# Patient Record
Sex: MALE | Race: WHITE | NOT HISPANIC OR LATINO | Employment: FULL TIME | URBAN - METROPOLITAN AREA
[De-identification: names, ages, dates, MRNs, and addresses within clinical notes are randomized per-mention and may not be internally consistent; named-entity substitution may affect disease eponyms.]

---

## 2022-08-17 ENCOUNTER — TRANSFERRED RECORDS (OUTPATIENT)
Dept: HEALTH INFORMATION MANAGEMENT | Facility: CLINIC | Age: 32
End: 2022-08-17

## 2023-01-11 ENCOUNTER — TRANSFERRED RECORDS (OUTPATIENT)
Dept: HEALTH INFORMATION MANAGEMENT | Facility: CLINIC | Age: 33
End: 2023-01-11

## 2023-01-11 LAB — EJECTION FRACTION: NORMAL %

## 2023-01-12 ENCOUNTER — TRANSFERRED RECORDS (OUTPATIENT)
Dept: HEALTH INFORMATION MANAGEMENT | Facility: CLINIC | Age: 33
End: 2023-01-12

## 2024-02-29 ENCOUNTER — OFFICE VISIT (OUTPATIENT)
Dept: CARDIOLOGY | Facility: CLINIC | Age: 34
End: 2024-02-29
Payer: COMMERCIAL

## 2024-02-29 VITALS
WEIGHT: 157.4 LBS | HEART RATE: 64 BPM | DIASTOLIC BLOOD PRESSURE: 88 MMHG | SYSTOLIC BLOOD PRESSURE: 110 MMHG | RESPIRATION RATE: 20 BRPM

## 2024-02-29 DIAGNOSIS — E78.5 DYSLIPIDEMIA, GOAL LDL BELOW 70: ICD-10-CM

## 2024-02-29 DIAGNOSIS — J45.20 MILD INTERMITTENT ASTHMA, UNSPECIFIED WHETHER COMPLICATED: ICD-10-CM

## 2024-02-29 DIAGNOSIS — I25.10 CORONARY ARTERY DISEASE INVOLVING NATIVE CORONARY ARTERY OF NATIVE HEART WITHOUT ANGINA PECTORIS: Primary | ICD-10-CM

## 2024-02-29 DIAGNOSIS — I10 BENIGN ESSENTIAL HYPERTENSION: ICD-10-CM

## 2024-02-29 LAB
ALBUMIN SERPL BCG-MCNC: 4.5 G/DL (ref 3.5–5.2)
ALP SERPL-CCNC: 92 U/L (ref 40–150)
ALT SERPL W P-5'-P-CCNC: 43 U/L (ref 0–70)
ANION GAP SERPL CALCULATED.3IONS-SCNC: 13 MMOL/L (ref 7–15)
AST SERPL W P-5'-P-CCNC: 31 U/L (ref 0–45)
BILIRUB SERPL-MCNC: 0.5 MG/DL
BUN SERPL-MCNC: 14.5 MG/DL (ref 6–20)
CALCIUM SERPL-MCNC: 9.3 MG/DL (ref 8.6–10)
CHLORIDE SERPL-SCNC: 104 MMOL/L (ref 98–107)
CREAT SERPL-MCNC: 0.88 MG/DL (ref 0.67–1.17)
DEPRECATED HCO3 PLAS-SCNC: 26 MMOL/L (ref 22–29)
EGFRCR SERPLBLD CKD-EPI 2021: >90 ML/MIN/1.73M2
ERYTHROCYTE [DISTWIDTH] IN BLOOD BY AUTOMATED COUNT: 11.6 % (ref 10–15)
GLUCOSE SERPL-MCNC: 61 MG/DL (ref 70–99)
HCT VFR BLD AUTO: 46 % (ref 40–53)
HGB BLD-MCNC: 16.2 G/DL (ref 13.3–17.7)
MCH RBC QN AUTO: 30.9 PG (ref 26.5–33)
MCHC RBC AUTO-ENTMCNC: 35.2 G/DL (ref 31.5–36.5)
MCV RBC AUTO: 88 FL (ref 78–100)
PLATELET # BLD AUTO: 266 10E3/UL (ref 150–450)
POTASSIUM SERPL-SCNC: 3.7 MMOL/L (ref 3.4–5.3)
PROT SERPL-MCNC: 7.5 G/DL (ref 6.4–8.3)
RBC # BLD AUTO: 5.24 10E6/UL (ref 4.4–5.9)
SODIUM SERPL-SCNC: 143 MMOL/L (ref 135–145)
WBC # BLD AUTO: 5.6 10E3/UL (ref 4–11)

## 2024-02-29 PROCEDURE — 80053 COMPREHEN METABOLIC PANEL: CPT | Performed by: INTERNAL MEDICINE

## 2024-02-29 PROCEDURE — 99204 OFFICE O/P NEW MOD 45 MIN: CPT | Performed by: INTERNAL MEDICINE

## 2024-02-29 PROCEDURE — 85027 COMPLETE CBC AUTOMATED: CPT | Performed by: INTERNAL MEDICINE

## 2024-02-29 PROCEDURE — 36415 COLL VENOUS BLD VENIPUNCTURE: CPT | Performed by: INTERNAL MEDICINE

## 2024-02-29 PROCEDURE — 80061 LIPID PANEL: CPT | Performed by: INTERNAL MEDICINE

## 2024-02-29 RX ORDER — ASPIRIN 81 MG/1
81 TABLET, CHEWABLE ORAL DAILY
COMMUNITY

## 2024-02-29 RX ORDER — QUETIAPINE FUMARATE 50 MG/1
50 TABLET, FILM COATED ORAL AT BEDTIME
COMMUNITY

## 2024-02-29 RX ORDER — LOSARTAN POTASSIUM 25 MG/1
25 TABLET ORAL DAILY
COMMUNITY
End: 2024-03-05

## 2024-02-29 RX ORDER — METOPROLOL SUCCINATE 100 MG/1
100 TABLET, EXTENDED RELEASE ORAL DAILY
COMMUNITY

## 2024-02-29 RX ORDER — ATORVASTATIN CALCIUM 40 MG/1
40 TABLET, FILM COATED ORAL DAILY
COMMUNITY
End: 2024-07-02

## 2024-02-29 RX ORDER — DEXTROAMPHETAMINE SACCHARATE, AMPHETAMINE ASPARTATE MONOHYDRATE, DEXTROAMPHETAMINE SULFATE AND AMPHETAMINE SULFATE 5; 5; 5; 5 MG/1; MG/1; MG/1; MG/1
20 CAPSULE, EXTENDED RELEASE ORAL EVERY MORNING
COMMUNITY

## 2024-02-29 RX ORDER — TRAZODONE HYDROCHLORIDE 50 MG/1
50 TABLET, FILM COATED ORAL AT BEDTIME
COMMUNITY

## 2024-02-29 RX ORDER — VENLAFAXINE HYDROCHLORIDE 75 MG/1
75 TABLET, EXTENDED RELEASE ORAL DAILY
COMMUNITY

## 2024-02-29 NOTE — PROGRESS NOTES
Click to link to [facility] HEART CARE NOTE    Thank you,  , for asking me to see Robb Jarvis in consultation at [facility] to establish cardiology care.      Assessment/Plan:   Coronary artery disease status post MI and stent placement in January 2022 in [place]: We will get his medical records including coronary angiogram with PCI procedure report from his previous cardiologist in [place].  He has no chest pain or shortness of breath.  He does exercise regularly, no symptoms.  Continue lifestyle modification.  Continue aspirin, atorvastatin, metoprolol ER.  Echocardiogram in January 2023 was reviewed, mentioned below.  Routine laboratory tests including CBC, CMP and lipid profile today.  Benign essential hypertension: His blood pressure is well-controlled.  Continue metoprolol  mg daily, losartan 25 mg daily.  Dyslipidemia: Continue atorvastatin 40 mg at bedtime.  Lipid profile and liver function as mentioned above.  Asthma: Stable.    Thank you for the opportunity to be involved in the care of Robb Jarvis. If you have any questions, please feel free to contact me.  I will see the patient again in 6 months and as needed    Much or all of the text in this note was generated through the use of Dragon Dictate voice-to-text software. Errors in spelling or words which seem out of context are unintentional.   Sound alike errors, in particular, may have escaped editing.       History of Present Illness:   It is my pleasure to see Robb Jarvis at the [facility] for establishing cardiology care. Robb Jarvis is a 33 year old male with a medical history of coronary artery disease status post MI and stent placement in January 2022 in [place], benign essential hypertension, dyslipidemia, mild exertional related asthma.    He states that he had no chest pain, shortness of breath.  He has been doing exercise, 6 miles in  some day with no chest pain or shortness of breath.  When he had food poisoning, he had some chest pain.  Otherwise, he did not have chest pain with exercise.  He denies shortness of breath on exertion.  He had no palpitations, dizziness, orthopnea, PND or leg edema.  His blood pressure and heart rate are controlled.    His medical records are reviewed.  Previous cardiologist note and coronary angiogram report are not available at this time.  We will get it.  He had echocardiogram in January 2023 when he had food poisoning.  Echo was reported normal LV size, wall thickness, hypokinesis in the basal inferior segment, normal LVEF, normal right ventricle size and function, no obvious valvular disease.    Past Medical History:     Patient Active Problem List   Diagnosis    Coronary artery disease involving native coronary artery of native heart without angina pectoris    Benign essential hypertension    Dyslipidemia, goal LDL below 70    Mild intermittent asthma, unspecified whether complicated       Past Surgical History:   No past surgical history on file.    Family History:   Reviewed: No family history of coronary artery disease.    Social History:    reports that he has never smoked. He has never been exposed to tobacco smoke. He has never used smokeless tobacco.    Review of Systems:   12 systems are reviewed negative except for in HPI.    Meds:     Current Outpatient Medications:     amphetamine-dextroamphetamine (ADDERALL XR) 20 MG 24 hr capsule, Take 20 mg by mouth every morning, Disp: , Rfl:     aspirin (ASA) 81 MG chewable tablet, Take 81 mg by mouth daily, Disp: , Rfl:     atorvastatin (LIPITOR) 40 MG tablet, Take 40 mg by mouth daily, Disp: , Rfl:     losartan (COZAAR) 25 MG tablet, Take 25 mg by mouth daily, Disp: , Rfl:     metoprolol succinate ER (TOPROL XL) 100 MG 24 hr tablet, Take 100 mg by mouth daily, Disp: , Rfl:     QUEtiapine (SEROQUEL) 50 MG tablet, Take 50 mg by mouth at bedtime, Disp: , Rfl:      traZODone (DESYREL) 50 MG tablet, Take 50 mg by mouth at bedtime, Disp: , Rfl:     venlafaxine (EFFEXOR-ER) 75 MG 24 hr tablet, Take 75 mg by mouth daily, Disp: , Rfl:      Allergies:   Chocolate, Mixed grasses, and Nickel    Objective:      Physical Exam  71.4 kg (157 lb 6.4 oz)     There is no height or weight on file to calculate BMI.  /88 (BP Location: Left arm, Patient Position: Sitting, Cuff Size: Adult Regular)   Pulse 64   Resp 20   Wt 71.4 kg (157 lb 6.4 oz)     General Appearance:   Awake, Alert, No acute distress.   HEENT:  Pupil equal, reactive to light. No scleral icterus; the mucous membranes were moist. No oral ulcers or thrush.    Neck: No cervical bruits. No JVD. No thyromegaly. No lymph node enlargement or tenderness.   Chest: The spine was straight. The chest was symmetric.   Lungs:   Respirations unlabored. Lungs are clear to auscultation. No crackles. No wheezing.   Cardiovascular:   RRR, normal first and second heart sounds with no murmurs. No rubs or gallops.    Abdomen:  Soft. No tenderness. Non-distended. Bowels sounds are present   Extremities: Equal posterior tibial pulses. No leg edema.   Skin: No rashes or ulcers. Warm, Dry.   Musculoskeletal: No tenderness. No deformity.   Neurologic: Mood and affect are appropriate. No focal deficits.         EKG:  Personally reivewed  Normal sinus rhythm  Normal ECG    Cardiac Imaging Studies  Normal left ventricular size, wall thickness, mild hypokinesis in the basal inferior segment, LVEF of 55 to 60%  Normal right ventricle size and function  No obvious valvular disease    Lab Review   Previous labs from outside medical system are reviewed, LDL 61.

## 2024-02-29 NOTE — LETTER
2/29/2024    Physician No Ref-Primary  No address on file    RE: Robb Jarvis       Dear Colleague,     I had the pleasure of seeing Robb Jarvis in the [facility].      Click to link to [facility] HEART [facility] NOTE    Thank you,  , for asking me to see Robb Jarvis in consultation at [facility] to establish cardiology care.      Assessment/Plan:   Coronary artery disease status post MI and stent placement in January 2022 in [place]: We will get his medical records including coronary angiogram with PCI procedure report from his previous cardiologist in [place].  He has no chest pain or shortness of breath.  He does exercise regularly, no symptoms.  Continue lifestyle modification.  Continue aspirin, atorvastatin, metoprolol ER.  Echocardiogram in January 2023 was reviewed, mentioned below.  Routine laboratory tests including CBC, CMP and lipid profile today.  Benign essential hypertension: His blood pressure is well-controlled.  Continue metoprolol  mg daily, losartan 25 mg daily.  Dyslipidemia: Continue atorvastatin 40 mg at bedtime.  Lipid profile and liver function as mentioned above.  Asthma: Stable.    Thank you for the opportunity to be involved in the care of Robb Jarvis. If you have any questions, please feel free to contact me.  I will see the patient again in 6 months and as needed    Much or all of the text in this note was generated through the use of Dragon Dictate voice-to-text software. Errors in spelling or words which seem out of context are unintentional.   Sound alike errors, in particular, may have escaped editing.       History of Present Illness:   It is my pleasure to see Robb Jarvis at the [facility] for establishing cardiology care. Robb Jarvis is a 33 year old male with a medical history of coronary artery disease status post MI and stent placement in January 2022 in  [place], benign essential hypertension, dyslipidemia, mild exertional related asthma.    He states that he had no chest pain, shortness of breath.  He has been doing exercise, 6 miles in some day with no chest pain or shortness of breath.  When he had food poisoning, he had some chest pain.  Otherwise, he did not have chest pain with exercise.  He denies shortness of breath on exertion.  He had no palpitations, dizziness, orthopnea, PND or leg edema.  His blood pressure and heart rate are controlled.    His medical records are reviewed.  Previous cardiologist note and coronary angiogram report are not available at this time.  We will get it.  He had echocardiogram in January 2023 when he had food poisoning.  Echo was reported normal LV size, wall thickness, hypokinesis in the basal inferior segment, normal LVEF, normal right ventricle size and function, no obvious valvular disease.    Past Medical History:     Patient Active Problem List   Diagnosis    Coronary artery disease involving native coronary artery of native heart without angina pectoris    Benign essential hypertension    Dyslipidemia, goal LDL below 70    Mild intermittent asthma, unspecified whether complicated       Past Surgical History:   No past surgical history on file.    Family History:   Reviewed: No family history of coronary artery disease.    Social History:    reports that he has never smoked. He has never been exposed to tobacco smoke. He has never used smokeless tobacco.    Review of Systems:   12 systems are reviewed negative except for in HPI.    Meds:     Current Outpatient Medications:     amphetamine-dextroamphetamine (ADDERALL XR) 20 MG 24 hr capsule, Take 20 mg by mouth every morning, Disp: , Rfl:     aspirin (ASA) 81 MG chewable tablet, Take 81 mg by mouth daily, Disp: , Rfl:     atorvastatin (LIPITOR) 40 MG tablet, Take 40 mg by mouth daily, Disp: , Rfl:     losartan (COZAAR) 25 MG tablet, Take 25 mg by mouth daily, Disp:  , Rfl:     metoprolol succinate ER (TOPROL XL) 100 MG 24 hr tablet, Take 100 mg by mouth daily, Disp: , Rfl:     QUEtiapine (SEROQUEL) 50 MG tablet, Take 50 mg by mouth at bedtime, Disp: , Rfl:     traZODone (DESYREL) 50 MG tablet, Take 50 mg by mouth at bedtime, Disp: , Rfl:     venlafaxine (EFFEXOR-ER) 75 MG 24 hr tablet, Take 75 mg by mouth daily, Disp: , Rfl:      Allergies:   Chocolate, Mixed grasses, and Nickel    Objective:      Physical Exam  71.4 kg (157 lb 6.4 oz)     There is no height or weight on file to calculate BMI.  /88 (BP Location: Left arm, Patient Position: Sitting, Cuff Size: Adult Regular)   Pulse 64   Resp 20   Wt 71.4 kg (157 lb 6.4 oz)     General Appearance:   Awake, Alert, No acute distress.   HEENT:  Pupil equal, reactive to light. No scleral icterus; the mucous membranes were moist. No oral ulcers or thrush.    Neck: No cervical bruits. No JVD. No thyromegaly. No lymph node enlargement or tenderness.   Chest: The spine was straight. The chest was symmetric.   Lungs:   Respirations unlabored. Lungs are clear to auscultation. No crackles. No wheezing.   Cardiovascular:   RRR, normal first and second heart sounds with no murmurs. No rubs or gallops.    Abdomen:  Soft. No tenderness. Non-distended. Bowels sounds are present   Extremities: Equal posterior tibial pulses. No leg edema.   Skin: No rashes or ulcers. Warm, Dry.   Musculoskeletal: No tenderness. No deformity.   Neurologic: Mood and affect are appropriate. No focal deficits.         EKG:  Personally reivewed  Normal sinus rhythm  Normal ECG    Cardiac Imaging Studies  Normal left ventricular size, wall thickness, mild hypokinesis in the basal inferior segment, LVEF of 55 to 60%  Normal right ventricle size and function  No obvious valvular disease    Lab Review   Previous labs from outside medical system are reviewed, LDL 61.                Thank you for allowing me to participate in the care of your  patient.      Sincerely,     Rinku Valdez MD     [facility]  cc:   Referred Self,

## 2024-03-01 LAB
CHOLEST SERPL-MCNC: 124 MG/DL
FASTING STATUS PATIENT QL REPORTED: NO
HDLC SERPL-MCNC: 27 MG/DL
LDLC SERPL CALC-MCNC: 60 MG/DL
NONHDLC SERPL-MCNC: 97 MG/DL
TRIGL SERPL-MCNC: 185 MG/DL

## 2024-03-05 DIAGNOSIS — I10 BENIGN ESSENTIAL HYPERTENSION: Primary | ICD-10-CM

## 2024-03-05 DIAGNOSIS — I25.10 CORONARY ARTERY DISEASE INVOLVING NATIVE CORONARY ARTERY OF NATIVE HEART WITHOUT ANGINA PECTORIS: ICD-10-CM

## 2024-03-05 RX ORDER — LOSARTAN POTASSIUM 25 MG/1
25 TABLET ORAL DAILY
Qty: 90 TABLET | Refills: 2 | Status: SHIPPED | OUTPATIENT
Start: 2024-03-05

## 2024-03-10 ENCOUNTER — HEALTH MAINTENANCE LETTER (OUTPATIENT)
Age: 34
End: 2024-03-10

## 2024-07-02 ENCOUNTER — TELEPHONE (OUTPATIENT)
Dept: CARDIOLOGY | Facility: CLINIC | Age: 34
End: 2024-07-02
Payer: COMMERCIAL

## 2024-07-02 DIAGNOSIS — E78.5 DYSLIPIDEMIA, GOAL LDL BELOW 70: Primary | ICD-10-CM

## 2024-07-02 RX ORDER — ATORVASTATIN CALCIUM 40 MG/1
40 TABLET, FILM COATED ORAL DAILY
Qty: 90 TABLET | Refills: 1 | Status: SHIPPED | OUTPATIENT
Start: 2024-07-02

## 2024-07-02 NOTE — TELEPHONE ENCOUNTER
From: Rinku Valdez MD  Sent: 7/2/2024   4:20 PM CDT  To: Yvonne Ferrari    Yes.  ----- Message -----  From: Yvonne Ferrari  Sent: 7/2/2024   1:08 PM CDT  To: Rinku Valdez MD    ----- Message from Yvonne Ferrari sent at 7/2/2024  1:08 PM CDT -----  Hi Dr. Valdez,    Please see encounter, we were not the initial prescribers for this medication, ok to send Atorvastatin 40 mg refills under you? Thanks-Yvonne

## 2024-12-03 ENCOUNTER — MYC REFILL (OUTPATIENT)
Dept: CARDIOLOGY | Facility: CLINIC | Age: 34
End: 2024-12-03
Payer: COMMERCIAL

## 2024-12-03 DIAGNOSIS — I25.10 CORONARY ARTERY DISEASE INVOLVING NATIVE CORONARY ARTERY OF NATIVE HEART WITHOUT ANGINA PECTORIS: ICD-10-CM

## 2024-12-03 DIAGNOSIS — I10 BENIGN ESSENTIAL HYPERTENSION: ICD-10-CM

## 2024-12-04 RX ORDER — LOSARTAN POTASSIUM 25 MG/1
25 TABLET ORAL DAILY
Qty: 90 TABLET | Refills: 0 | Status: SHIPPED | OUTPATIENT
Start: 2024-12-04

## 2025-03-16 ENCOUNTER — HEALTH MAINTENANCE LETTER (OUTPATIENT)
Age: 35
End: 2025-03-16